# Patient Record
Sex: FEMALE | URBAN - METROPOLITAN AREA
[De-identification: names, ages, dates, MRNs, and addresses within clinical notes are randomized per-mention and may not be internally consistent; named-entity substitution may affect disease eponyms.]

---

## 2017-01-27 ENCOUNTER — OUTPATIENT (OUTPATIENT)
Dept: OUTPATIENT SERVICES | Facility: HOSPITAL | Age: 32
LOS: 1 days | Discharge: HOME | End: 2017-01-27
Payer: MEDICARE

## 2017-01-27 DIAGNOSIS — Z01.810 ENCOUNTER FOR PREPROCEDURAL CARDIOVASCULAR EXAMINATION: ICD-10-CM

## 2017-01-27 DIAGNOSIS — I48.3 TYPICAL ATRIAL FLUTTER: ICD-10-CM

## 2017-01-27 PROCEDURE — 93010 ELECTROCARDIOGRAM REPORT: CPT

## 2017-02-02 ENCOUNTER — OUTPATIENT (OUTPATIENT)
Dept: OUTPATIENT SERVICES | Facility: HOSPITAL | Age: 32
LOS: 1 days | Discharge: HOME | End: 2017-02-02
Payer: MEDICARE

## 2017-02-02 VITALS
WEIGHT: 250 LBS | RESPIRATION RATE: 18 BRPM | HEIGHT: 66 IN | OXYGEN SATURATION: 95 % | SYSTOLIC BLOOD PRESSURE: 133 MMHG | DIASTOLIC BLOOD PRESSURE: 86 MMHG | TEMPERATURE: 99 F | HEART RATE: 95 BPM

## 2017-02-02 DIAGNOSIS — G47.33 OBSTRUCTIVE SLEEP APNEA (ADULT) (PEDIATRIC): ICD-10-CM

## 2017-02-02 DIAGNOSIS — M19.90 UNSPECIFIED OSTEOARTHRITIS, UNSPECIFIED SITE: ICD-10-CM

## 2017-02-02 DIAGNOSIS — E11.9 TYPE 2 DIABETES MELLITUS WITHOUT COMPLICATIONS: ICD-10-CM

## 2017-02-02 DIAGNOSIS — J35.3 HYPERTROPHY OF TONSILS WITH HYPERTROPHY OF ADENOIDS: Chronic | ICD-10-CM

## 2017-02-02 DIAGNOSIS — E78.5 HYPERLIPIDEMIA, UNSPECIFIED: ICD-10-CM

## 2017-02-02 DIAGNOSIS — Z87.891 PERSONAL HISTORY OF NICOTINE DEPENDENCE: ICD-10-CM

## 2017-02-02 DIAGNOSIS — E78.00 PURE HYPERCHOLESTEROLEMIA, UNSPECIFIED: ICD-10-CM

## 2017-02-02 DIAGNOSIS — L73.9 FOLLICULAR DISORDER, UNSPECIFIED: ICD-10-CM

## 2017-02-02 DIAGNOSIS — L73.2 HIDRADENITIS SUPPURATIVA: ICD-10-CM

## 2017-02-02 DIAGNOSIS — F32.9 MAJOR DEPRESSIVE DISORDER, SINGLE EPISODE, UNSPECIFIED: ICD-10-CM

## 2017-02-02 DIAGNOSIS — I48.92 UNSPECIFIED ATRIAL FLUTTER: ICD-10-CM

## 2017-02-02 DIAGNOSIS — D64.9 ANEMIA, UNSPECIFIED: ICD-10-CM

## 2017-02-02 DIAGNOSIS — D47.2 MONOCLONAL GAMMOPATHY: ICD-10-CM

## 2017-02-02 DIAGNOSIS — Z79.01 LONG TERM (CURRENT) USE OF ANTICOAGULANTS: ICD-10-CM

## 2017-02-02 DIAGNOSIS — F31.9 BIPOLAR DISORDER, UNSPECIFIED: ICD-10-CM

## 2017-02-02 DIAGNOSIS — F41.9 ANXIETY DISORDER, UNSPECIFIED: ICD-10-CM

## 2017-02-02 DIAGNOSIS — I47.1 SUPRAVENTRICULAR TACHYCARDIA: ICD-10-CM

## 2017-02-02 DIAGNOSIS — E66.01 MORBID (SEVERE) OBESITY DUE TO EXCESS CALORIES: ICD-10-CM

## 2017-02-02 DIAGNOSIS — Z79.84 LONG TERM (CURRENT) USE OF ORAL HYPOGLYCEMIC DRUGS: ICD-10-CM

## 2017-02-02 LAB
ALBUMIN SERPL ELPH-MCNC: 3 G/DL — LOW (ref 3.3–5)
ALP SERPL-CCNC: 67 U/L — SIGNIFICANT CHANGE UP (ref 40–120)
ALT FLD-CCNC: 18 U/L — SIGNIFICANT CHANGE UP (ref 12–78)
ANION GAP SERPL CALC-SCNC: 9 MMOL/L — SIGNIFICANT CHANGE UP (ref 5–17)
AST SERPL-CCNC: 11 U/L — LOW (ref 15–37)
BILIRUB SERPL-MCNC: 0.2 MG/DL — SIGNIFICANT CHANGE UP (ref 0.2–1.2)
BUN SERPL-MCNC: 8 MG/DL — SIGNIFICANT CHANGE UP (ref 7–23)
CALCIUM SERPL-MCNC: 8.6 MG/DL — SIGNIFICANT CHANGE UP (ref 8.5–10.1)
CHLORIDE SERPL-SCNC: 106 MMOL/L — SIGNIFICANT CHANGE UP (ref 96–108)
CO2 SERPL-SCNC: 24 MMOL/L — SIGNIFICANT CHANGE UP (ref 22–31)
CREAT SERPL-MCNC: 0.72 MG/DL — SIGNIFICANT CHANGE UP (ref 0.5–1.3)
GLUCOSE SERPL-MCNC: 117 MG/DL — HIGH (ref 70–99)
HCG UR QL: NEGATIVE — SIGNIFICANT CHANGE UP
POTASSIUM SERPL-MCNC: 3.7 MMOL/L — SIGNIFICANT CHANGE UP (ref 3.5–5.3)
POTASSIUM SERPL-SCNC: 3.7 MMOL/L — SIGNIFICANT CHANGE UP (ref 3.5–5.3)
PROT SERPL-MCNC: 8.9 GM/DL — HIGH (ref 6–8.3)
SODIUM SERPL-SCNC: 139 MMOL/L — SIGNIFICANT CHANGE UP (ref 135–145)

## 2017-02-02 PROCEDURE — 93010 ELECTROCARDIOGRAM REPORT: CPT

## 2017-02-02 RX ORDER — METFORMIN HYDROCHLORIDE 850 MG/1
1 TABLET ORAL
Qty: 0 | Refills: 0 | COMMUNITY

## 2017-02-02 RX ORDER — CEPHALEXIN 500 MG
0 CAPSULE ORAL
Qty: 0 | Refills: 0 | COMMUNITY

## 2017-02-02 RX ORDER — APIXABAN 2.5 MG/1
0 TABLET, FILM COATED ORAL
Qty: 0 | Refills: 0 | COMMUNITY

## 2017-02-02 RX ORDER — SODIUM CHLORIDE 9 MG/ML
3 INJECTION INTRAMUSCULAR; INTRAVENOUS; SUBCUTANEOUS EVERY 8 HOURS
Qty: 0 | Refills: 0 | Status: DISCONTINUED | OUTPATIENT
Start: 2017-02-02 | End: 2017-02-03

## 2017-02-02 RX ORDER — METFORMIN HYDROCHLORIDE 850 MG/1
500 TABLET ORAL DAILY
Qty: 0 | Refills: 0 | Status: DISCONTINUED | OUTPATIENT
Start: 2017-02-02 | End: 2017-02-03

## 2017-02-02 RX ORDER — APIXABAN 2.5 MG/1
5 TABLET, FILM COATED ORAL
Qty: 0 | Refills: 0 | Status: DISCONTINUED | OUTPATIENT
Start: 2017-02-02 | End: 2017-02-03

## 2017-02-02 RX ORDER — ACETAMINOPHEN 500 MG
650 TABLET ORAL EVERY 6 HOURS
Qty: 0 | Refills: 0 | Status: DISCONTINUED | OUTPATIENT
Start: 2017-02-02 | End: 2017-02-03

## 2017-02-02 RX ORDER — CEPHALEXIN 500 MG
500 CAPSULE ORAL
Qty: 0 | Refills: 0 | Status: DISCONTINUED | OUTPATIENT
Start: 2017-02-02 | End: 2017-02-03

## 2017-02-02 RX ORDER — GABAPENTIN 400 MG/1
2 CAPSULE ORAL
Qty: 0 | Refills: 0 | COMMUNITY

## 2017-02-02 RX ORDER — GABAPENTIN 400 MG/1
600 CAPSULE ORAL
Qty: 0 | Refills: 0 | Status: DISCONTINUED | OUTPATIENT
Start: 2017-02-02 | End: 2017-02-03

## 2017-02-02 RX ADMIN — GABAPENTIN 600 MILLIGRAM(S): 400 CAPSULE ORAL at 19:08

## 2017-02-02 RX ADMIN — Medication 500 MILLIGRAM(S): at 19:09

## 2017-02-02 RX ADMIN — APIXABAN 5 MILLIGRAM(S): 2.5 TABLET, FILM COATED ORAL at 19:07

## 2017-02-02 RX ADMIN — SODIUM CHLORIDE 3 MILLILITER(S): 9 INJECTION INTRAMUSCULAR; INTRAVENOUS; SUBCUTANEOUS at 21:53

## 2017-02-02 NOTE — ASU PATIENT PROFILE, ADULT - PMH
Anemia, unspecified type    Anxiety    Arthritis    Atrial flutter, unspecified type    Depressive disorder    DM (diabetes mellitus)    Hidradenitis suppurativa    High cholesterol    Monoclonal gammopathy    CATRACHO (obstructive sleep apnea)    Other hyperlipidemia

## 2017-02-03 ENCOUNTER — TRANSCRIPTION ENCOUNTER (OUTPATIENT)
Age: 32
End: 2017-02-03

## 2017-02-03 VITALS
SYSTOLIC BLOOD PRESSURE: 105 MMHG | RESPIRATION RATE: 16 BRPM | HEART RATE: 91 BPM | TEMPERATURE: 98 F | DIASTOLIC BLOOD PRESSURE: 58 MMHG | OXYGEN SATURATION: 96 %

## 2017-02-03 LAB
ANION GAP SERPL CALC-SCNC: 9 MMOL/L — SIGNIFICANT CHANGE UP (ref 5–17)
BUN SERPL-MCNC: 6 MG/DL — LOW (ref 7–23)
CALCIUM SERPL-MCNC: 8.9 MG/DL — SIGNIFICANT CHANGE UP (ref 8.5–10.1)
CHLORIDE SERPL-SCNC: 104 MMOL/L — SIGNIFICANT CHANGE UP (ref 96–108)
CO2 SERPL-SCNC: 25 MMOL/L — SIGNIFICANT CHANGE UP (ref 22–31)
CREAT SERPL-MCNC: 0.75 MG/DL — SIGNIFICANT CHANGE UP (ref 0.5–1.3)
GLUCOSE SERPL-MCNC: 142 MG/DL — HIGH (ref 70–99)
HCT VFR BLD CALC: 32.7 % — LOW (ref 34.5–45)
HGB BLD-MCNC: 10.4 G/DL — LOW (ref 11.5–15.5)
MCHC RBC-ENTMCNC: 25.1 PG — LOW (ref 27–34)
MCHC RBC-ENTMCNC: 31.9 GM/DL — LOW (ref 32–36)
MCV RBC AUTO: 78.7 FL — LOW (ref 80–100)
PLATELET # BLD AUTO: 485 K/UL — HIGH (ref 150–400)
POTASSIUM SERPL-MCNC: 3.9 MMOL/L — SIGNIFICANT CHANGE UP (ref 3.5–5.3)
POTASSIUM SERPL-SCNC: 3.9 MMOL/L — SIGNIFICANT CHANGE UP (ref 3.5–5.3)
RBC # BLD: 4.15 M/UL — SIGNIFICANT CHANGE UP (ref 3.8–5.2)
RBC # FLD: 14 % — SIGNIFICANT CHANGE UP (ref 10.3–14.5)
SODIUM SERPL-SCNC: 138 MMOL/L — SIGNIFICANT CHANGE UP (ref 135–145)
WBC # BLD: 10.5 K/UL — SIGNIFICANT CHANGE UP (ref 3.8–10.5)
WBC # FLD AUTO: 10.5 K/UL — SIGNIFICANT CHANGE UP (ref 3.8–10.5)

## 2017-02-03 PROCEDURE — 93010 ELECTROCARDIOGRAM REPORT: CPT

## 2017-02-03 RX ORDER — ACETAMINOPHEN 500 MG
2 TABLET ORAL
Qty: 0 | Refills: 0 | COMMUNITY
Start: 2017-02-03

## 2017-02-03 RX ADMIN — SODIUM CHLORIDE 3 MILLILITER(S): 9 INJECTION INTRAMUSCULAR; INTRAVENOUS; SUBCUTANEOUS at 06:49

## 2017-02-03 RX ADMIN — GABAPENTIN 600 MILLIGRAM(S): 400 CAPSULE ORAL at 05:30

## 2017-02-03 RX ADMIN — Medication 500 MILLIGRAM(S): at 05:30

## 2017-02-03 RX ADMIN — APIXABAN 5 MILLIGRAM(S): 2.5 TABLET, FILM COATED ORAL at 05:30

## 2017-02-03 NOTE — DISCHARGE NOTE ADULT - PATIENT PORTAL LINK FT
“You can access the [facility] Patient Portal, offered by [facility], by registering with the following website: [email or web address]”

## 2017-02-03 NOTE — DISCHARGE NOTE ADULT - HOSPITAL COURSE
Pt s/p A-flutter, SVT ablation Day #1 Stable for discharge-Denies any c/o CP SOB or palpitations. Bilateral groin sites intact no hematoma or bleeding noted. NSR on EKG and tele. Pt to continue preprocedure meds.

## 2017-02-03 NOTE — DISCHARGE NOTE ADULT - CARE PROVIDER_API CALL
Roxana Sierra (NP; RN), NP in Acute Care; NP in Adult Health  [address] [place] [id number]  Phone: [phone]  Fax: [phone]

## 2017-02-03 NOTE — DISCHARGE NOTE ADULT - CARE PLAN
Principal Discharge DX:	Atypical atrial flutter  Goal:	Maintain Sinus Rythmn  Instructions for follow-up, activity and diet:	no heavy lifting, no tub baths, or hot tubs, may shower in 24 hours, avoid prolonged sitting

## 2017-02-03 NOTE — DISCHARGE NOTE ADULT - MEDICATION SUMMARY - MEDICATIONS TO TAKE
I will START or STAY ON the medications listed below when I get home from the hospital:    Percocet 5/325 oral tablet  -- 1 tab(s) by mouth every 6 hours, As Needed  -- Indication: For Prn pain from home    acetaminophen 325 mg oral tablet  -- 2 tab(s) by mouth every 6 hours, As needed, Mild Pain (1 - 3)  -- Indication: For Prn mild- moderate pain    Eliquis 5 mg oral tablet  --  by mouth 2 times a day  -- Indication: For Atrial flutter    Neurontin 300 mg oral capsule  -- 2 cap(s) by mouth 2 times a day  -- Indication: For nerve pain    metFORMIN 500 mg oral tablet, extended release  -- 1 tab(s) by mouth once a day  -- Indication: For DM (diabetes mellitus)    Keflex 500 mg oral capsule  --  by mouth 2 times a day  -- Indication: For Anti-infective

## 2017-02-03 NOTE — CHART NOTE - NSCHARTNOTEFT_GEN_A_CORE
Procedure Performed 2-2-2017    Electrophysiology Study Report / [facility]         Patient Information  Patient Name			Kim Martin  Study Date             		February 2, 2017  MR#		[id number]  Date of Birth			1985  Referring Physician		Nuno Cordoba MD    Patient History      Kim is a very pleasant 31-year-old white female with a past medical history significant for Hidradenitis suppurativa as well as hyperlipidemia, non-insulin dependent diabetes mellitus, and morbid obesity, who presented to [facility] for evaluation of an accelerated heart rate. On November 16, 2016 she was seen and noted to be in atrial flutter by 12-lead electrocardiogram. In fact, I have reviewed the 12-lead electrocardiogram and concur that the arrhythmia is typical atrial flutter with 3:1 conduction pattern.  Kim continues to note episodes where her heart rate races. She also notes episodic lightheadedness with palpitations.   She was seen in your office and referred here for follow up.  The patient’s tachycardia is not associated with chest pain or shortness of breath, although she does admit to occasional lightheadedness, but there has been no syncope.  She has no prior cardiac history.      She has been on Eliquis (apixaban) 5 mg twice daily for over a month and has been compliant with this prescription.      Procedure: Diagnostic EP Study and Atrial Flutter Ablation and SVT Ablation    Indication: Atrial Flutter    Anesthesia: As per anesthesiology note and 1% Lidocaine to each groin site    Methods:   The right and left groins were prepped with chorhexidine and then draped via sterile technique.  A 1% lidocaine solution was used to anesthetize each groin site.  Using the modified seldinger technique left and right femoral venous access was obtained and sheaths were placed (see below for further details).  Catheters were brought up to the heart and diagnostic EP testing was performed followed by Atrial Flutter ablation (please see below for further detail).  At the completion of the procedure the catheters were removed the sheaths were removed and pressure was held and hemostasis obtained.  The patient was observed in the recovery orozco and was noted to be in stable condition.  Equipment:            Sheaths  Left Femoral vein – 5 fr sheath and 8fr sheath  Right Femoral vein - 8 fr Sheath (8.5 fr Ramp long sheath exchanged,), 7 fr sheath (8 Romansh Ramp sheath exchanged),   Catheters            deflectable duodecapolar, deflectable decapolar, quadripolar (CRD-2), BioSense Smart Touch irrigated tip, Ablation Catheter, 4mm Navistar FJ Biosense Ablation Catheter    Mapping             Bios-sense 3 D Mapping   Findings:            Baseline Intervals  	Baseline conduction times across the CT Isthmus were obtained and measured 73-78ms.  Empiric ablation was performed in the CT isthmus (see below) using the BioSense Smart Touch irrigated tip, Ablation Catheter.  There was difficulty achieving block.  The patient went into typical AVNRT during the manipulation of catheters.  The VA time was 0 and retrograde atrial activation was concentric.  There was a V-A-V response with atrial entrainment.  The decision was made to proceed with slow pathway ablation.  The 4mm Navistar FJ Biosense Ablation Catheter was used for the slow pathway (see below for details)                         RR – 703ms // AH  62ms / HV-   49ms       IA  144ms / QRS  94ms / QT  372ms                        Ventricular Testing               VABCL-   VA conduction was concentric and had decrement                           Notes  	Dual AV nodes were demonstrated and echo beats occurred.  There was a critical AH needed to induce typical AVNRT which could be performed by pacing A1/A2 – single atrial extra-stimuli.  A V-A-V response was demonstrated with VOD during SVT.       Arrhythmias : as described                    Ablation:      CTI Ablation : RF ablation was performed empirically with the goal of creating evidence of bidirectional block in the CT isthmus.  3D mapping ([employer]) was used during Thermocool “SmartTouch” ablation delivery with up to 35W of power delivered.  The CT isthmus  had a  successful line of block created and evidence of block persisted post ablation.  Post ablation CW block was 130ms and CCW block was also 133ms. Also the activation pattern clearly changed in both directions.  Furthermore differential pacing confirmed block in the CCW direction.  Slow Pathway Ablation : a His cloud was created.  Slow pathway ablation was performed in the P and M region of the septum and numerous junctionals were elicited.  At the completion of all ablation, Isuprel was infused and programmed stimulation with atrial extra-stimuli and overdrive pacing did not result in any inducible arrhythmias.      Post Ablation Testing:            Baseline intervals including the HV interval after the final RF application were measured and noted to be unchanged from prior to ablation.        Complications:            None      Impression:  •	     Successful Cavo-Tricuspid Isthmus (CTI) Ablation for Clockwise type Typical Flutter  •	Bidirectional block created along the CTI  •	     Typical AVNRT  o	Successful slow pathway modification    Recommendation(s):  •	Re-start Eliquis 4 hours after sheath removal and continue BID thereafter for one month  •	Bedrest for 4 hours and observe on telemetry overnight  •	F/U in the office in 4 weeks

## 2017-02-03 NOTE — DISCHARGE NOTE ADULT - PLAN OF CARE
Maintain Sinus Rythmn no heavy lifting, no tub baths, or hot tubs, may shower in 24 hours, avoid prolonged sitting

## 2017-06-28 ENCOUNTER — OUTPATIENT (OUTPATIENT)
Dept: OUTPATIENT SERVICES | Facility: HOSPITAL | Age: 32
LOS: 1 days | End: 2017-06-28
Payer: MEDICARE

## 2017-06-28 DIAGNOSIS — G47.10 HYPERSOMNIA, UNSPECIFIED: ICD-10-CM

## 2017-06-28 DIAGNOSIS — J35.3 HYPERTROPHY OF TONSILS WITH HYPERTROPHY OF ADENOIDS: Chronic | ICD-10-CM

## 2017-06-28 PROCEDURE — 95806 SLEEP STUDY UNATT&RESP EFFT: CPT | Mod: 26

## 2017-06-28 PROCEDURE — 95810 POLYSOM 6/> YRS 4/> PARAM: CPT

## 2020-10-26 PROBLEM — M19.90 UNSPECIFIED OSTEOARTHRITIS, UNSPECIFIED SITE: Chronic | Status: ACTIVE | Noted: 2017-02-02

## 2020-10-26 PROBLEM — E78.00 PURE HYPERCHOLESTEROLEMIA, UNSPECIFIED: Chronic | Status: ACTIVE | Noted: 2017-02-02

## 2020-10-26 PROBLEM — F32.9 MAJOR DEPRESSIVE DISORDER, SINGLE EPISODE, UNSPECIFIED: Chronic | Status: ACTIVE | Noted: 2017-02-02

## 2020-10-26 PROBLEM — D47.2 MONOCLONAL GAMMOPATHY: Chronic | Status: ACTIVE | Noted: 2017-02-02

## 2020-10-26 PROBLEM — I48.92 UNSPECIFIED ATRIAL FLUTTER: Chronic | Status: ACTIVE | Noted: 2017-02-02

## 2020-10-26 PROBLEM — D64.9 ANEMIA, UNSPECIFIED: Chronic | Status: ACTIVE | Noted: 2017-02-02

## 2020-10-26 PROBLEM — E11.9 TYPE 2 DIABETES MELLITUS WITHOUT COMPLICATIONS: Chronic | Status: ACTIVE | Noted: 2017-02-02

## 2020-10-26 PROBLEM — F41.9 ANXIETY DISORDER, UNSPECIFIED: Chronic | Status: ACTIVE | Noted: 2017-02-02

## 2020-10-26 PROBLEM — L73.2 HIDRADENITIS SUPPURATIVA: Chronic | Status: ACTIVE | Noted: 2017-02-02

## 2020-10-26 PROBLEM — G47.33 OBSTRUCTIVE SLEEP APNEA (ADULT) (PEDIATRIC): Chronic | Status: ACTIVE | Noted: 2017-02-02

## 2020-10-26 PROBLEM — E78.4 OTHER HYPERLIPIDEMIA: Chronic | Status: ACTIVE | Noted: 2017-02-02

## 2020-10-29 ENCOUNTER — APPOINTMENT (OUTPATIENT)
Dept: CARDIOLOGY | Facility: CLINIC | Age: 35
End: 2020-10-29
Payer: MEDICARE

## 2020-10-29 VITALS
HEIGHT: 66 IN | TEMPERATURE: 98 F | SYSTOLIC BLOOD PRESSURE: 144 MMHG | BODY MASS INDEX: 41.78 KG/M2 | WEIGHT: 260 LBS | DIASTOLIC BLOOD PRESSURE: 88 MMHG | HEART RATE: 105 BPM | RESPIRATION RATE: 16 BRPM

## 2020-10-29 DIAGNOSIS — Z87.891 PERSONAL HISTORY OF NICOTINE DEPENDENCE: ICD-10-CM

## 2020-10-29 DIAGNOSIS — Z86.79 PERSONAL HISTORY OF OTHER DISEASES OF THE CIRCULATORY SYSTEM: ICD-10-CM

## 2020-10-29 DIAGNOSIS — L73.2 HIDRADENITIS SUPPURATIVA: ICD-10-CM

## 2020-10-29 DIAGNOSIS — E78.1 PURE HYPERGLYCERIDEMIA: ICD-10-CM

## 2020-10-29 DIAGNOSIS — R00.2 PALPITATIONS: ICD-10-CM

## 2020-10-29 DIAGNOSIS — G35 MULTIPLE SCLEROSIS: ICD-10-CM

## 2020-10-29 DIAGNOSIS — E78.00 PURE HYPERCHOLESTEROLEMIA, UNSPECIFIED: ICD-10-CM

## 2020-10-29 PROCEDURE — 99204 OFFICE O/P NEW MOD 45 MIN: CPT

## 2020-10-29 PROCEDURE — 93000 ELECTROCARDIOGRAM COMPLETE: CPT

## 2020-10-29 RX ORDER — TAPENTADOL HYDROCHLORIDE 100 MG/1
100 TABLET, FILM COATED ORAL AT BEDTIME
Refills: 0 | Status: ACTIVE | COMMUNITY

## 2020-10-29 RX ORDER — DULOXETINE HYDROCHLORIDE 20 MG/1
20 CAPSULE, DELAYED RELEASE ORAL TWICE DAILY
Refills: 0 | Status: ACTIVE | COMMUNITY

## 2020-10-29 RX ORDER — ICOSAPENT ETHYL 1000 MG/1
1 CAPSULE ORAL TWICE DAILY
Refills: 0 | Status: ACTIVE | COMMUNITY

## 2020-10-29 RX ORDER — METFORMIN HYDROCHLORIDE 500 MG/1
500 TABLET, COATED ORAL DAILY
Qty: 90 | Refills: 1 | Status: ACTIVE | COMMUNITY

## 2020-10-29 RX ORDER — SULFAMETHOXAZOLE AND TRIMETHOPRIM 800; 160 MG/1; MG/1
800-160 TABLET ORAL TWICE DAILY
Refills: 0 | Status: ACTIVE | COMMUNITY

## 2020-10-29 RX ORDER — FENOFIBRATE 160 MG/1
160 TABLET ORAL DAILY
Qty: 90 | Refills: 3 | Status: ACTIVE | COMMUNITY
Start: 2020-10-29

## 2020-11-12 ENCOUNTER — TRANSCRIPTION ENCOUNTER (OUTPATIENT)
Age: 35
End: 2020-11-12

## 2020-12-28 ENCOUNTER — APPOINTMENT (OUTPATIENT)
Dept: CARDIOLOGY | Facility: CLINIC | Age: 35
End: 2020-12-28

## 2021-01-08 ENCOUNTER — APPOINTMENT (OUTPATIENT)
Dept: CARDIOLOGY | Facility: CLINIC | Age: 36
End: 2021-01-08

## 2022-11-22 NOTE — ASU PREOP CHECKLIST - VIA
Plan to get Liver US with doppler in February and August  Dr. April Vasquez labs in August  Follow up in 1 year with Dr. April Vasquez
stretcher